# Patient Record
Sex: MALE | URBAN - METROPOLITAN AREA
[De-identification: names, ages, dates, MRNs, and addresses within clinical notes are randomized per-mention and may not be internally consistent; named-entity substitution may affect disease eponyms.]

---

## 2017-03-03 ENCOUNTER — IMPORTED ENCOUNTER (OUTPATIENT)
Dept: URBAN - METROPOLITAN AREA CLINIC 9 | Facility: CLINIC | Age: 67
End: 2017-03-03

## 2017-03-17 ENCOUNTER — IMPORTED ENCOUNTER (OUTPATIENT)
Dept: URBAN - METROPOLITAN AREA CLINIC 9 | Facility: CLINIC | Age: 67
End: 2017-03-17

## 2019-04-05 ENCOUNTER — IMPORTED ENCOUNTER (OUTPATIENT)
Dept: URBAN - METROPOLITAN AREA CLINIC 9 | Facility: CLINIC | Age: 69
End: 2019-04-05

## 2020-10-08 ENCOUNTER — IMPORTED ENCOUNTER (OUTPATIENT)
Dept: URBAN - METROPOLITAN AREA CLINIC 9 | Facility: CLINIC | Age: 70
End: 2020-10-08

## 2021-10-16 ASSESSMENT — VISUAL ACUITY
OD_CC: 20/40 +2 SN
OS_CC: 20/40 - SN
OS_CC: 20/40 SN
OD_CC: 20/20 -2 SN
OS_SC: CF 2FT SN
OD_CC: 20/40 -2 SN
OS_SC: 20/400 SN
OS_CC: 20/50 - SN
OS_SC: 20/400 SN
OD_SC: CF 2FT SN
OD_SC: 20/400 SN
OD_SC: CF 2FT SN
OD_CC: 20/30 -2 SN
OS_CC: 20/40 SN

## 2021-10-16 ASSESSMENT — KERATOMETRY
OD_K2POWER_DIOPTERS: 45.75
OD_AXISANGLE2_DEGREES: 102
OS_AXISANGLE_DEGREES: 152
OD_AXISANGLE_DEGREES: 29
OS_K2POWER_DIOPTERS: 45.5
OD_K1POWER_DIOPTERS: 44.25
OS_AXISANGLE_DEGREES: 111
OD_K2POWER_DIOPTERS: 45
OD_AXISANGLE_DEGREES: 76
OS_AXISANGLE2_DEGREES: 21
OS_AXISANGLE2_DEGREES: 69
OS_K1POWER_DIOPTERS: 44.5
OS_K1POWER_DIOPTERS: 44
OS_K1POWER_DIOPTERS: 44
OD_K1POWER_DIOPTERS: 44.25
OD_K2POWER_DIOPTERS: 44.75
OD_AXISANGLE2_DEGREES: 166
OD_AXISANGLE_DEGREES: 12
OD_AXISANGLE2_DEGREES: 119
OS_K2POWER_DIOPTERS: 45
OS_AXISANGLE2_DEGREES: 62
OS_AXISANGLE_DEGREES: 159
OD_K1POWER_DIOPTERS: 44.5
OS_K2POWER_DIOPTERS: 45

## 2021-10-16 ASSESSMENT — TONOMETRY
OS_IOP_MMHG: 15
OD_IOP_MMHG: 14
OD_IOP_MMHG: 17
OD_IOP_MMHG: 18
OD_IOP_MMHG: 13
OS_IOP_MMHG: 17
OS_IOP_MMHG: 13
OS_IOP_MMHG: 14
OS_IOP_MMHG: 18

## 2021-12-06 NOTE — PATIENT DISCUSSION
Mild symptoms overall. 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 Start artificial tears PRN. Follow up with any worsening symptoms.

## 2022-09-08 ENCOUNTER — ESTABLISHED PATIENT (OUTPATIENT)
Dept: URBAN - METROPOLITAN AREA CLINIC 9 | Facility: CLINIC | Age: 72
End: 2022-09-08

## 2022-09-08 DIAGNOSIS — H11.153: ICD-10-CM

## 2022-09-08 DIAGNOSIS — H52.13: ICD-10-CM

## 2022-09-08 DIAGNOSIS — H04.123: ICD-10-CM

## 2022-09-08 DIAGNOSIS — H43.393: ICD-10-CM

## 2022-09-08 DIAGNOSIS — H25.13: ICD-10-CM

## 2022-09-08 PROCEDURE — 92015 DETERMINE REFRACTIVE STATE: CPT

## 2022-09-08 PROCEDURE — 92310B CONTACT LEN 60

## 2022-09-08 PROCEDURE — 92014 COMPRE OPH EXAM EST PT 1/>: CPT

## 2022-09-08 ASSESSMENT — TONOMETRY
OD_IOP_MMHG: 18
OS_IOP_MMHG: 18

## 2022-09-08 ASSESSMENT — KERATOMETRY
OD_AXISANGLE2_DEGREES: 119
OD_K1POWER_DIOPTERS: 44.5
OS_AXISANGLE_DEGREES: 159
OD_K2POWER_DIOPTERS: 45.75
OS_K1POWER_DIOPTERS: 44
OD_AXISANGLE_DEGREES: 29
OS_K2POWER_DIOPTERS: 45
OS_AXISANGLE2_DEGREES: 69

## 2022-09-08 ASSESSMENT — VISUAL ACUITY
OS_CC: J3
OD_CC: J3
OU_CC: 20/25
OS_SC: 20/400
OS_GLARE: 20/50
OS_CC: 20/25
OD_SC: 20/400
OD_CC: 20/25
OU_SC: 20/400
OD_GLARE: 20/50

## 2023-03-02 ENCOUNTER — APPOINTMENT (OUTPATIENT)
Dept: URBAN - METROPOLITAN AREA SURGERY 15 | Age: 73
Setting detail: DERMATOLOGY
End: 2023-03-03

## 2023-03-02 PROBLEM — C44.622 SQUAMOUS CELL CARCINOMA OF SKIN OF RIGHT UPPER LIMB, INCLUDING SHOULDER: Status: ACTIVE | Noted: 2023-03-02

## 2023-03-02 PROCEDURE — 17313 MOHS 1 STAGE T/A/L: CPT

## 2023-03-02 PROCEDURE — 13121 CMPLX RPR S/A/L 2.6-7.5 CM: CPT

## 2023-03-02 PROCEDURE — OTHER COUNSELING: OTHER

## 2023-03-02 PROCEDURE — OTHER MOHS SURGERY: OTHER

## 2023-03-02 PROCEDURE — OTHER SURGICAL DECISION MAKING: OTHER

## 2023-03-02 NOTE — PROCEDURE: MOHS SURGERY
Implemented All Universal Safety Interventions:  [place] to call system. Call bell, personal items and telephone within reach. Instruct patient to call for assistance. Room bathroom lighting operational. Non-slip footwear when patient is off stretcher. Physically safe environment: no spills, clutter or unnecessary equipment. Stretcher in lowest position, wheels locked, appropriate side rails in place. 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

## 2023-03-02 NOTE — PROCEDURE: MOHS SURGERY
Body Location Override (Optional - Billing Will Still Be Based On Selected Body Map Location If Applicable): right dorsal arm

## 2023-03-02 NOTE — PROCEDURE: SURGICAL DECISION MAKING
Complexity (Necessary For Coding; Major - 90 Day Global With Some Exceptions; Minor - 10 Day Global): major
Identified Risk Factors Documented?: yes
Risk Assessment Explanation (Does Not Render In The Note): Clinical determination of the probability and/or consequences of an event, such as surgery. Clinical assessment of the level of risk is affected by the nature of the event under consideration for the patient. Modifier 57 is used to indicate an Evaluation and Management (E/M) service resulted in the initial decision to perform surgery either the day before a major surgery (90 day global) or the day of a major surgery.
Discussion: After the mohs procedure was complete, a separate and distinct evaluation and management was performed for the resultant surgical defect for potential reconstruction using flap or graft.  Complications and risk of morbidity of each were discussed including (but not limited to) scarring, which could distort a free anatomic margin of the eyelid, nose, ear or lip.
Date Of Surgery - Today Or Tomorrow?: today
Initial Decision For Surgery?: No

## 2023-03-02 NOTE — HPI: PROCEDURE (MOHS)
Has The Growth Been Previously Biopsied?: has been previously biopsied
Body Location Override (Optional): right dorsal arm

## 2023-03-02 NOTE — PROCEDURE: MOHS SURGERY
Area Suspicious For Tumor Histology Text: An area suspicious for additional tumor was noted and excised with additional stage(s). MED/SURG

## 2023-07-25 ENCOUNTER — APPOINTMENT (OUTPATIENT)
Dept: URBAN - METROPOLITAN AREA SURGERY 15 | Age: 73
Setting detail: DERMATOLOGY
End: 2023-07-26

## 2023-07-25 PROBLEM — C44.319 BASAL CELL CARCINOMA OF SKIN OF OTHER PARTS OF FACE: Status: ACTIVE | Noted: 2023-07-25

## 2023-07-25 PROCEDURE — 13132 CMPLX RPR F/C/C/M/N/AX/G/H/F: CPT

## 2023-07-25 PROCEDURE — OTHER COUNSELING: OTHER

## 2023-07-25 PROCEDURE — OTHER MOHS SURGERY: OTHER

## 2023-07-25 PROCEDURE — OTHER SURGICAL DECISION MAKING: OTHER

## 2023-07-25 PROCEDURE — 17311 MOHS 1 STAGE H/N/HF/G: CPT

## 2023-07-25 NOTE — PROCEDURE: SURGICAL DECISION MAKING
Risk Assessment Explanation (Does Not Render In The Note): Clinical determination of the probability and/or consequences of an event, such as surgery. Clinical assessment of the level of risk is affected by the nature of the event under consideration for the patient. Modifier 57 is used to indicate an Evaluation and Management (E/M) service resulted in the initial decision to perform surgery either the day before a major surgery (90 day global) or the day of a major surgery.
Complexity (Necessary For Coding; Major - 90 Day Global With Some Exceptions; Minor - 10 Day Global): major
Initial Decision For Surgery?: Yes
Date Of Surgery - Today Or Tomorrow?: today
Discussion: After the mohs procedure was complete, a separate and distinct evaluation and management was performed for the resultant surgical defect for potential reconstruction using flap or graft.  Complications and risk of morbidity of each were discussed including (but not limited to) scarring, which could distort a free anatomic margin of the eyelid, nose, ear or lip.

## 2023-08-02 ENCOUNTER — APPOINTMENT (OUTPATIENT)
Dept: URBAN - METROPOLITAN AREA SURGERY 15 | Age: 73
Setting detail: DERMATOLOGY
End: 2023-08-02

## 2023-08-02 DIAGNOSIS — Z48.02 ENCOUNTER FOR REMOVAL OF SUTURES: ICD-10-CM

## 2023-08-02 PROCEDURE — OTHER MIPS QUALITY: OTHER

## 2023-08-02 PROCEDURE — 99024 POSTOP FOLLOW-UP VISIT: CPT

## 2023-08-02 PROCEDURE — OTHER SUTURE REMOVAL (GLOBAL PERIOD): OTHER

## 2023-08-02 ASSESSMENT — LOCATION SIMPLE DESCRIPTION DERM: LOCATION SIMPLE: RIGHT CHEEK

## 2023-08-02 ASSESSMENT — LOCATION ZONE DERM: LOCATION ZONE: FACE

## 2023-08-02 ASSESSMENT — LOCATION DETAILED DESCRIPTION DERM: LOCATION DETAILED: RIGHT CENTRAL MALAR CHEEK

## 2023-08-02 NOTE — PROCEDURE: SUTURE REMOVAL (GLOBAL PERIOD)
Body Location Override (Optional - Billing Will Still Be Based On Selected Body Map Location If Applicable): right cheek
Detail Level: Detailed
Add 73538 Cpt? (Important Note: In 2017 The Use Of 05502 Is Being Tracked By Cms To Determine Future Global Period Reimbursement For Global Periods): yes

## 2023-09-13 ENCOUNTER — ESTABLISHED PATIENT (OUTPATIENT)
Dept: URBAN - METROPOLITAN AREA CLINIC 9 | Facility: CLINIC | Age: 73
End: 2023-09-13

## 2023-09-13 DIAGNOSIS — H52.13: ICD-10-CM

## 2023-09-13 DIAGNOSIS — H25.13: ICD-10-CM

## 2023-09-13 PROCEDURE — 92014 COMPRE OPH EXAM EST PT 1/>: CPT

## 2023-09-13 PROCEDURE — 92015 DETERMINE REFRACTIVE STATE: CPT

## 2023-09-13 PROCEDURE — 92310B CONTACT LEN 60

## 2023-09-13 ASSESSMENT — VISUAL ACUITY
OD_GLARE: 20/40
OU_SC: 20/400
OD_SC: 20/400
OS_SC: 20/400
OS_GLARE: 20/40

## 2023-09-13 ASSESSMENT — KERATOMETRY
OD_K2POWER_DIOPTERS: 45.25
OS_K1POWER_DIOPTERS: 44.25
OS_AXISANGLE2_DEGREES: 69
OS_AXISANGLE_DEGREES: 159
OS_K2POWER_DIOPTERS: 45.25
OD_AXISANGLE_DEGREES: 13
OD_AXISANGLE2_DEGREES: 103
OD_K1POWER_DIOPTERS: 43.75

## 2023-09-13 ASSESSMENT — TONOMETRY
OD_IOP_MMHG: 18
OS_IOP_MMHG: 18

## 2024-06-03 NOTE — PROCEDURE: MOHS SURGERY
Family Paramedian Forehead Flap Text: Because of the size and full-thickness nature of the defect, and to maintain form and function of the nose, a forehead flap with bilateral forehead advancement flap closure of the donor site was planned.  After prep and anesthesia, excisional preparation of the recipient site was performed by outlining the cosmetic unit of the nasal tip.  Hemostasis was obtained.  A template was then made of the defect and transferred with the appropriate length to the upper forehead.  The forehead flap was incised and elevated based on the supratrochlear neurovascular bundle.  This was carefully dissected over the orbital rim to the nasion.  It was distally thinned and transferred to the nasal tip.  This was sutured in a layered fashion.  To close the donor site defect on the forehead, a large Burow’s triangle was excised superiorly and posteriorly into the scalp, and two large flaps were elevated by undermining the entire anterior scalp and forehead to the temples bilaterally, and over the supraorbital rim inferiorly.  After hemostasis was obtained, these flaps were advanced and closed in a layered fashion.

## 2024-09-05 NOTE — PROCEDURE: MOHS SURGERY
Positive Helical Rim Advancement Flap Text: Because of the tightness of the surrounding skin, the full-thickness nature of the defect with exposed cartilage, and to avoid deformity, a helical rim advancement flap was planned.  After prep and anesthesia, any protuberant cartilage or cartilage in the way of tissue movement and approximation was excised.  Then, an incision was made in the anterior portion of the ear through the skin to the posterior ear at the level of the perichondrium both superiorly and inferiorly.  Inferiorly, this extended to the lobule where a Burow’s triangle was excised anteriorly.  The flaps were then  from the ear posteriorly at the level of the perichondrium to the postauricular sulcus.  After hemostasis obtained, the flaps were advanced and closed in a layered fashion